# Patient Record
Sex: MALE | Race: WHITE | URBAN - METROPOLITAN AREA
[De-identification: names, ages, dates, MRNs, and addresses within clinical notes are randomized per-mention and may not be internally consistent; named-entity substitution may affect disease eponyms.]

---

## 2020-06-25 ENCOUNTER — CLAIMS CREATED FROM THE CLAIM WINDOW (OUTPATIENT)
Dept: URBAN - METROPOLITAN AREA CLINIC 4 | Facility: CLINIC | Age: 60
End: 2020-06-25
Payer: COMMERCIAL

## 2020-06-25 ENCOUNTER — OFFICE VISIT (OUTPATIENT)
Dept: URBAN - METROPOLITAN AREA SURGERY CENTER 23 | Facility: SURGERY CENTER | Age: 60
End: 2020-06-25
Payer: COMMERCIAL

## 2020-06-25 DIAGNOSIS — D12.4 ADENOMATOUS POLYP OF DESCENDING COLON: ICD-10-CM

## 2020-06-25 DIAGNOSIS — D12.6 BENIGN NEOPLASM OF COLON, UNSPECIFIED: ICD-10-CM

## 2020-06-25 PROCEDURE — G9937 DIG OR SURV COLSCO: HCPCS | Performed by: INTERNAL MEDICINE

## 2020-06-25 PROCEDURE — G8907 PT DOC NO EVENTS ON DISCHARG: HCPCS | Performed by: INTERNAL MEDICINE

## 2020-06-25 PROCEDURE — 45385 COLONOSCOPY W/LESION REMOVAL: CPT | Performed by: INTERNAL MEDICINE

## 2020-06-25 PROCEDURE — 88305 TISSUE EXAM BY PATHOLOGIST: CPT | Performed by: PATHOLOGY

## 2022-07-18 ENCOUNTER — P2P PATIENT RECORD (OUTPATIENT)
Age: 62
End: 2022-07-18

## 2022-09-08 ENCOUNTER — CLAIMS CREATED FROM THE CLAIM WINDOW (OUTPATIENT)
Dept: URBAN - METROPOLITAN AREA CLINIC 70 | Facility: CLINIC | Age: 62
End: 2022-09-08
Payer: COMMERCIAL

## 2022-09-08 ENCOUNTER — LAB OUTSIDE AN ENCOUNTER (OUTPATIENT)
Dept: URBAN - METROPOLITAN AREA CLINIC 70 | Facility: CLINIC | Age: 62
End: 2022-09-08

## 2022-09-08 ENCOUNTER — WEB ENCOUNTER (OUTPATIENT)
Dept: URBAN - METROPOLITAN AREA CLINIC 70 | Facility: CLINIC | Age: 62
End: 2022-09-08

## 2022-09-08 VITALS
DIASTOLIC BLOOD PRESSURE: 72 MMHG | HEART RATE: 64 BPM | BODY MASS INDEX: 22.73 KG/M2 | TEMPERATURE: 99.7 F | HEIGHT: 68 IN | WEIGHT: 150 LBS | SYSTOLIC BLOOD PRESSURE: 119 MMHG

## 2022-09-08 DIAGNOSIS — Z86.010 HISTORY OF COLON POLYPS: ICD-10-CM

## 2022-09-08 DIAGNOSIS — K58.1 IRRITABLE BOWEL SYNDROME WITH CONSTIPATION: ICD-10-CM

## 2022-09-08 PROBLEM — 428283002: Status: ACTIVE | Noted: 2022-09-08

## 2022-09-08 PROBLEM — 440630006: Status: ACTIVE | Noted: 2022-09-08

## 2022-09-08 PROCEDURE — 99214 OFFICE O/P EST MOD 30 MIN: CPT | Performed by: INTERNAL MEDICINE

## 2022-09-08 PROCEDURE — 99214 OFFICE O/P EST MOD 30 MIN: CPT

## 2022-09-08 RX ORDER — CELECOXIB 50 MG/1
1 CAPSULE WITH FOOD CAPSULE ORAL TWICE A DAY
Status: ACTIVE | COMMUNITY

## 2022-09-08 NOTE — HPI-TODAY'S VISIT:
The pt presents for constipation.  He admits to treatment with Tramadol for back pain and states constipation began shortly after starting this medication.  He admits to bowel movements every 3-4 days without feelings of complete evacuation.  He has tried miralax, senokot, enemas, stool softeners, OTC laxatives, and magnesium citrate without improvement in constipation.  Last colonoscopy was 6/25/2020 showing a 2mm TA in the descending colon and non-bleeding internal hemorrhoids.  Recall 5 years.  He denies a fhx of colon cancer.  He denies weight loss, rectal bleeding, diarrhea, or melena.

## 2022-09-30 ENCOUNTER — TELEPHONE ENCOUNTER (OUTPATIENT)
Dept: URBAN - METROPOLITAN AREA CLINIC 70 | Facility: CLINIC | Age: 62
End: 2022-09-30

## 2022-09-30 RX ORDER — LACTULOSE 10 G/15ML
30ML SOLUTION ORAL ONCE A DAY
Qty: 900 ML | Refills: 4 | OUTPATIENT
Start: 2022-09-30 | End: 2023-02-26

## 2022-11-04 ENCOUNTER — OFFICE VISIT (OUTPATIENT)
Dept: URBAN - METROPOLITAN AREA CLINIC 70 | Facility: CLINIC | Age: 62
End: 2022-11-04
Payer: COMMERCIAL

## 2022-11-04 ENCOUNTER — WEB ENCOUNTER (OUTPATIENT)
Dept: URBAN - METROPOLITAN AREA CLINIC 70 | Facility: CLINIC | Age: 62
End: 2022-11-04

## 2022-11-04 VITALS
TEMPERATURE: 99 F | WEIGHT: 152.6 LBS | HEART RATE: 69 BPM | DIASTOLIC BLOOD PRESSURE: 72 MMHG | HEIGHT: 68 IN | BODY MASS INDEX: 23.13 KG/M2 | SYSTOLIC BLOOD PRESSURE: 124 MMHG

## 2022-11-04 DIAGNOSIS — K59.04 CHRONIC IDIOPATHIC CONSTIPATION: ICD-10-CM

## 2022-11-04 PROCEDURE — 99214 OFFICE O/P EST MOD 30 MIN: CPT

## 2022-11-04 RX ORDER — CELECOXIB 50 MG/1
1 CAPSULE WITH FOOD CAPSULE ORAL TWICE A DAY
Status: ACTIVE | COMMUNITY

## 2022-11-04 RX ORDER — LACTULOSE 10 G/15ML
30ML SOLUTION ORAL ONCE A DAY
Qty: 900 ML | Refills: 4 | Status: ACTIVE | COMMUNITY
Start: 2022-09-30 | End: 2023-02-26

## 2022-11-04 RX ORDER — LACTULOSE 10 G/15ML
30ML SOLUTION ORAL ONCE A DAY
Qty: 900 ML | Refills: 4
Start: 2022-09-30 | End: 2023-04-03

## 2022-11-04 NOTE — HPI-OTHER HISTORIES
OV 9/8/2022: The pt presents for constipation.  He admits to treatment with Tramadol for back pain and states constipation began shortly after starting this medication.  He admits to bowel movements every 3-4 days without feelings of complete evacuation.  He has tried miralax, senokot, enemas, stool softeners, OTC laxatives, and magnesium citrate without improvement in constipation.  Last colonoscopy was 6/25/2020 showing a 2mm TA in the descending colon and non-bleeding internal hemorrhoids.  Recall 5 years.  He denies a fhx of colon cancer.  He denies weight loss, rectal bleeding, diarrhea, or melena.

## 2022-11-04 NOTE — HPI-TODAY'S VISIT:
The pt presents for a f/u for constipation.  KUB 9/12/2022 was normal. Today he states Linzess 290mcg samples caused abdominal bloating, cramps, and exessived diarrhea. Rx for Lactulose was also sent to the pt's pharmacy, but he states he never picked up the rx.  No other new GI symptoms.

## 2022-12-16 ENCOUNTER — DASHBOARD ENCOUNTERS (OUTPATIENT)
Age: 62
End: 2022-12-16

## 2022-12-16 ENCOUNTER — OFFICE VISIT (OUTPATIENT)
Dept: URBAN - METROPOLITAN AREA CLINIC 70 | Facility: CLINIC | Age: 62
End: 2022-12-16
Payer: COMMERCIAL

## 2022-12-16 VITALS
DIASTOLIC BLOOD PRESSURE: 84 MMHG | WEIGHT: 165 LBS | HEART RATE: 68 BPM | TEMPERATURE: 97.7 F | HEIGHT: 68 IN | BODY MASS INDEX: 25.01 KG/M2 | SYSTOLIC BLOOD PRESSURE: 137 MMHG

## 2022-12-16 DIAGNOSIS — K59.04 CHRONIC IDIOPATHIC CONSTIPATION: ICD-10-CM

## 2022-12-16 PROBLEM — 82934008: Status: ACTIVE | Noted: 2022-11-04

## 2022-12-16 PROCEDURE — 99213 OFFICE O/P EST LOW 20 MIN: CPT

## 2022-12-16 RX ORDER — CELECOXIB 50 MG/1
1 CAPSULE WITH FOOD CAPSULE ORAL TWICE A DAY
Status: ACTIVE | COMMUNITY

## 2022-12-16 RX ORDER — LACTULOSE 10 G/15ML
30ML SOLUTION ORAL ONCE A DAY
Qty: 900 ML | Refills: 4 | Status: ON HOLD | COMMUNITY
Start: 2022-09-30 | End: 2023-04-03

## 2022-12-16 NOTE — HPI-TODAY'S VISIT:
The pt presents for a f/u for constipation.  Today he states constipation has improved with diet changes.  He is currently taking Miralax prn.  No other GI symptoms.

## 2022-12-16 NOTE — HPI-OTHER HISTORIES
OV 11/4/2022: The pt presents for a f/u for constipation.  KUB 9/12/2022 was normal. Today he states Linzess 290mcg samples caused abdominal bloating, cramps, and exessived diarrhea. Rx for Lactulose was also sent to the pt's pharmacy, but he states he never picked up the rx.  No other new GI symptoms. ------------------ OV 9/8/2022: The pt presents for constipation.  He admits to treatment with Tramadol for back pain and states constipation began shortly after starting this medication.  He admits to bowel movements every 3-4 days without feelings of complete evacuation.  He has tried miralax, senokot, enemas, stool softeners, OTC laxatives, and magnesium citrate without improvement in constipation.  Last colonoscopy was 6/25/2020 showing a 2mm TA in the descending colon and non-bleeding internal hemorrhoids.  Recall 5 years.  He denies a fhx of colon cancer.  He denies weight loss, rectal bleeding, diarrhea, or melena.